# Patient Record
Sex: FEMALE | Race: WHITE
[De-identification: names, ages, dates, MRNs, and addresses within clinical notes are randomized per-mention and may not be internally consistent; named-entity substitution may affect disease eponyms.]

---

## 2019-01-05 ENCOUNTER — HOSPITAL ENCOUNTER (OUTPATIENT)
Dept: HOSPITAL 80 - FED | Age: 29
Discharge: HOME | End: 2019-01-05
Attending: SURGERY
Payer: SELF-PAY

## 2019-01-05 VITALS — DIASTOLIC BLOOD PRESSURE: 85 MMHG | SYSTOLIC BLOOD PRESSURE: 124 MMHG

## 2019-01-05 DIAGNOSIS — K35.30: Primary | ICD-10-CM

## 2019-01-05 LAB — PLATELET # BLD: 258 10^3/UL (ref 150–400)

## 2019-01-05 PROCEDURE — 0DTJ0ZZ RESECTION OF APPENDIX, OPEN APPROACH: ICD-10-PCS | Performed by: SURGERY

## 2019-01-05 NOTE — PDDCSUM
Discharge Summary


Discharge Summary: 


Yanna underwent open appendectomy for early acute appendicitis.  She recovered 

uneventfully in the PACU and requested early discharge.


She was able to tolerate liquids and her incision was uncomplicated.  


DC meds: Norco 5/325 #20


   Ibuprofen 600 mg #30


   Senokot S #30


She will call my office for any questions or concerns/she was scheduled to fly 

out of state for work on Monday, but will delay that trip until Wednesday.


We discussed diet, activity and wound care





S MD Faith, FACS

## 2019-01-05 NOTE — POSTOPPROG
Post Op Note


Date of Operation: 01/05/19


Surgeon: Tae Cuevas (MD, FACS)


Anesthesiologist: Adan Lamb MD


Anesthesia: GET(General Endotracheal)


Pre-op Diagnosis: appendicitis


Procedure: appendectomy


Findings: acute appendicitis


Inf/Abcess present in the surg proc area at time of surgery?: Yes


Depth: Organ Space


EBL: Minimal (10ml)


Specimen(s): 





appendix

## 2019-01-05 NOTE — EDPHY
H & P


Stated Complaint: rlq abd pain


Time Seen by Provider: 01/05/19 09:34


HPI/ROS: 





CHIEF COMPLAINT:  Right lower quadrant abdominal pain since last evening





HISTORY OF PRESENT ILLNESS:  28-year-old female no history of abdominal 

surgeries complaining of right lower quadrant pain.  Initially started as 

periumbilical pain which is now migrated to the right lower quadrant since last 

evening.  Positive nausea.  No appetite.  Normal bowel movements.  No melena 

hematochezia.  No headache.  No fever no chills.  No trauma.  Last menstrual 

period 1 week ago.  Last oral intake lunch yesterday.





REVIEW OF SYSTEMS:


10 systems reviewed and negative with the exception of the elements mentioned 

in the history of present illness








PAST MEDICAL & SURGICAL  HISTORY:  No pertinent medical or surgical history    





SOCIAL HISTORY: Nonsmoker    








************


PHYSICAL EXAM





(Prior to examination, patient consented to physical exam, hands were washed 

and my usual and customary physical exam procedures followed)


1) GENERAL: Well-developed, well-nourished, alert and oriented.  Appears to be 

in no acute distress.


2) HEAD: Normocephalic, atraumatic


3) HEENT: Pupils equal, round, reactive to light bilaterally.  Sclera 

anicteric. 


4) NECK: Full range of motion, no meningeal signs.


5) LUNGS: Clear auscultation bilaterally, no wheezes, no rhonchi, no 

retractions.   


6) HEART: Regular rate and rhythm, no murmur, no heave, no gallop.


7) ABDOMEN: No guarding, positive McBurney's point pain, negative Huerta's, 

negative Rovsing's, negative peritoneal sign,


8) MUSCULOSKELETAL: Moving all extremities, no focal areas of tenderness, no 

obvious trauma.  No peripheral edema or discoloration.


9) BACK: No CVA tenderness, no midline vertebral tenderness, no fluctuance, no 

step-off, no obvious trauma, no visual or palpable abnormality. 


10) SKIN: No rash, no petechiae. 


11) Psychiatric:  Patient is oriented X 3, there is no agitation.








***************





DIFFERENTIAL DIAGNOSIS:   My differential diagnosis includes, but is not 

limited to, acute appendicitis, acute cholecystitis, bowel obstruction, acute 

pancreatitis, ovarian torsion, ectopic pregnancy, gastritis and urinary tract 

infection.  The patient understands that this diagnosis is provisional and can 

never be 100% accurate.  This is a partial list of diagnoses considered. These 

considerations are based on history, physical exam, past history and 

reassessment.








- Personal History


LMP (Females 10-55): 1-7 Days Ago


Current Tetanus/Diphtheria Vaccine: Yes





- Medical/Surgical History


Hx Asthma: No


Hx Chronic Respiratory Disease: No


Hx Diabetes: No


Hx Cardiac Disease: No


Hx Renal Disease: No


Hx Cirrhosis: No


Hx Alcoholism: No


Hx HIV/AIDS: No


Hx Splenectomy or Spleen Trauma: No


Other PMH: denies





- Social History


Smoking Status: Never smoked


Constitutional: 


 Initial Vital Signs











Temperature (C)  36.5 C   01/05/19 09:27


 


Heart Rate  78   01/05/19 09:27


 


Respiratory Rate  17   01/05/19 09:27


 


Blood Pressure  137/76 H  01/05/19 09:27


 


O2 Sat (%)  98   01/05/19 09:27








 











O2 Delivery Mode               Room Air














Allergies/Adverse Reactions: 


 





No Known Allergies Allergy (Unverified 01/05/19 09:27)


 








Home Medications: 














 Medication  Instructions  Recorded


 


NK [No Known Home Meds]  01/05/19














Medical Decision Making





- Diagnostics


Imaging Results: 


 Imaging Impressions





Abdomen Ultrasound  01/05/19 09:45


Impression: Nondiagnostic assessment of the appendix.


 


If there is further clinical concern regarding the patient's right lower 

quadrant pain, contrast-enhanced CT imaging could be considered.


 


Findings were discussed with XIN Alegria PA-C at 10:54, on 1/5/2019.


 


 








Pelvic/Renal Ultrasound  01/05/19 09:45


Impression: 


 


1. There appears to be a complex collapsing right ovarian structure measuring 

2.3 cm in diameter, with no evidence of torsion or free fluid. As clinically 

directed, repeat evaluation in 8-12 weeks could be considered.


2. There is an intrauterine device, which appears appropriately positioned with 

the caveat that there is some limitation in evaluation of the position of the 

right side arm. 


3. There is a subcentimeter collapsing cyst at the anterior mid endometrial-

myometrial interface.


 


Findings were discussed with XIN Alegria PA-C at 10:55, on 1/5/2019.


 








Abdomen CT  01/05/19 11:13


Impression: Acute appendicitis/periappendicitis, with mild associated 

gastroenteric dysmotility.


 


Findings were discussed with LUNA Alegria at 12:54, on 1/5/2019.


 


 











ED Course/Re-evaluation: 





9:57 a.m.:  Patient has focal to tender to palpation right lower quadrant.  

Will obtain both pelvic and abdominal ultrasound to evaluate ovaries and 

appendix.  She remains NPO since lunch yesterday.  Care of patient under 

supervision of secondary supervising physician Dr Junior with whom I discussed 

case.





11:14 a.m.:  Re-evaluation.  Patient's ultrasound of the abdomen is 

nondiagnostic for appendicitis.  She is noted to have involuting ovarian cyst 

which may be the etiology of her symptoms however on re-examination she remains 

focally tender McBurney's point, white count of 19573.  Recommended CT imaging 

of the abdomen pelvis.  Indications risks benefits discussed with patient and 

she verbally consents.





12:56 p.m.:  CT abdomen pelvis interpreted by staff radiologist positive for 

appendicitis at this time.  Will consult with surgery administer IV 

antibiotics.  Last oral intake was lunch yesterday





1:00 p.m.:  Consultation Dr. Cuevas who will take patient to OR later.  Spoke 

with the patient.  She verbalized understanding of her current diagnosis.  She 

remains NPO since yesterday afternoon.  Will Be given IV Rocephin and Flagyl.











- Data Points


Laboratory Results: 


 Laboratory Results





 01/05/19 09:40 





 01/05/19 09:40 





 











  01/05/19 01/05/19 01/05/19





  09:50 09:40 09:40


 


WBC      





    


 


RBC      





    


 


Hgb      





    


 


Hct      





    


 


MCV      





    


 


MCH      





    


 


MCHC      





    


 


RDW      





    


 


Plt Count      





    


 


MPV      





    


 


Neut % (Auto)      





    


 


Lymph % (Auto)      





    


 


Mono % (Auto)      





    


 


Eos % (Auto)      





    


 


Baso % (Auto)      





    


 


Nucleat RBC Rel Count      





    


 


Absolute Neuts (auto)      





    


 


Absolute Lymphs (auto)      





    


 


Absolute Monos (auto)      





    


 


Absolute Eos (auto)      





    


 


Absolute Basos (auto)      





    


 


Absolute Nucleated RBC      





    


 


Immature Gran %      





    


 


Immature Gran #      





    


 


Sodium      140 mEq/L mEq/L





     (135-145) 


 


Potassium      3.5 mEq/L mEq/L





     (3.5-5.2) 


 


Chloride      104 mEq/L mEq/L





     () 


 


Carbon Dioxide      23 mEq/l mEq/l





     (22-31) 


 


Anion Gap      13 mEq/L mEq/L





     (6-14) 


 


BUN      9 mg/dL mg/dL





     (7-23) 


 


Creatinine      0.8 mg/dL mg/dL





     (0.6-1.0) 


 


Estimated GFR      > 60 





    


 


Glucose      93 mg/dL mg/dL





     () 


 


Calcium      9.8 mg/dL mg/dL





     (8.5-10.4) 


 


Total Bilirubin      1.0 mg/dL mg/dL





     (0.1-1.4) 


 


Conjugated Bilirubin      0.3 mg/dL mg/dL





     (0.0-0.5) 


 


Unconjugated Bilirubin      0.7 mg/dL mg/dL





     (0.0-1.1) 


 


AST      24 IU/L IU/L





     (14-46) 


 


ALT      10 IU/L IU/L





     (9-52) 


 


Alkaline Phosphatase      71 IU/L IU/L





     () 


 


Total Protein      8.5 g/dL H g/dL





     (6.3-8.2) 


 


Albumin      5.1 g/dL H g/dL





     (3.5-5.0) 


 


Lipase      84 IU/L IU/L





     () 


 


Beta HCG, Qual    NEGATIVE   





    


 


Urine Color  VENTURA     





    


 


Urine Appearance  MODERATELY TURBID     





    


 


Urine pH  5.0     





   (5.0-7.5)   


 


Ur Specific Gravity  1.027     





   (1.002-1.030)   


 


Urine Protein  1+  H     





   (NEGATIVE)   


 


Urine Ketones  1+  H     





   (NEGATIVE)   


 


Urine Blood  1+  H     





   (NEGATIVE)   


 


Urine Nitrate  NEGATIVE     





   (NEGATIVE)   


 


Urine Bilirubin  NEGATIVE     





   (NEGATIVE)   


 


Urine Urobilinogen  NEGATIVE EU EU    





   (0.2-1.0)   


 


Ur Leukocyte Esterase  TRACE  H     





   (NEGATIVE)   


 


Urine RBC  3-5 /hpf H /hpf    





   (0-3)   


 


Urine WBC  5-10 /hpf H /hpf    





   (0-3)   


 


Ur Epithelial Cells  1+ /lpf /lpf    





   (NONE-1+)   


 


Urine Bacteria  TRACE /hpf H /hpf    





   (NONE SEEN)   


 


Urine Mucus  4+ /lpf H /lpf    





   (NONE-1+)   


 


Urine Glucose  NEGATIVE     





   (NEGATIVE)   














  01/05/19





  09:40


 


WBC  14.88 10^3/uL H 10^3/uL





   (3.80-9.50) 


 


RBC  4.69 10^6/uL 10^6/uL





   (4.18-5.33) 


 


Hgb  14.3 g/dL g/dL





   (12.6-16.3) 


 


Hct  42.6 % %





   (38.0-47.0) 


 


MCV  90.8 fL fL





   (81.5-99.8) 


 


MCH  30.5 pg pg





   (27.9-34.1) 


 


MCHC  33.6 g/dL g/dL





   (32.4-36.7) 


 


RDW  12.8 % %





   (11.5-15.2) 


 


Plt Count  258 10^3/uL 10^3/uL





   (150-400) 


 


MPV  9.8 fL fL





   (8.7-11.7) 


 


Neut % (Auto)  76.8 % H %





   (39.3-74.2) 


 


Lymph % (Auto)  14.7 % L %





   (15.0-45.0) 


 


Mono % (Auto)  7.9 % %





   (4.5-13.0) 


 


Eos % (Auto)  0.2 % L %





   (0.6-7.6) 


 


Baso % (Auto)  0.2 % L %





   (0.3-1.7) 


 


Nucleat RBC Rel Count  0.0 % %





   (0.0-0.2) 


 


Absolute Neuts (auto)  11.43 10^3/uL H 10^3/uL





   (1.70-6.50) 


 


Absolute Lymphs (auto)  2.19 10^3/uL 10^3/uL





   (1.00-3.00) 


 


Absolute Monos (auto)  1.17 10^3/uL H 10^3/uL





   (0.30-0.80) 


 


Absolute Eos (auto)  0.03 10^3/uL 10^3/uL





   (0.03-0.40) 


 


Absolute Basos (auto)  0.03 10^3/uL 10^3/uL





   (0.02-0.10) 


 


Absolute Nucleated RBC  0.00 10^3/uL 10^3/uL





   (0-0.01) 


 


Immature Gran %  0.2 % %





   (0.0-1.1) 


 


Immature Gran #  0.03 10^3/uL 10^3/uL





   (0.00-0.10) 


 


Sodium  





  


 


Potassium  





  


 


Chloride  





  


 


Carbon Dioxide  





  


 


Anion Gap  





  


 


BUN  





  


 


Creatinine  





  


 


Estimated GFR  





  


 


Glucose  





  


 


Calcium  





  


 


Total Bilirubin  





  


 


Conjugated Bilirubin  





  


 


Unconjugated Bilirubin  





  


 


AST  





  


 


ALT  





  


 


Alkaline Phosphatase  





  


 


Total Protein  





  


 


Albumin  





  


 


Lipase  





  


 


Beta HCG, Qual  





  


 


Urine Color  





  


 


Urine Appearance  





  


 


Urine pH  





  


 


Ur Specific Gravity  





  


 


Urine Protein  





  


 


Urine Ketones  





  


 


Urine Blood  





  


 


Urine Nitrate  





  


 


Urine Bilirubin  





  


 


Urine Urobilinogen  





  


 


Ur Leukocyte Esterase  





  


 


Urine RBC  





  


 


Urine WBC  





  


 


Ur Epithelial Cells  





  


 


Urine Bacteria  





  


 


Urine Mucus  





  


 


Urine Glucose  





  











Medications Given: 


 





Ceftriaxone Sodium/Dextrose (Rocephin 1 Gm (Premix))  50 mls @ 100 mls/hr IV 

EDNOW ONE


   PRN Reason: Protocol


   Stop: 01/05/19 13:27


   Last Admin: 01/05/19 13:07 Dose:  50 mls


Metronidazole/Sodium Chloride (Flagyl 500 Mg (Premix))  100 mls @ 100 mls/hr IV 

EDNOW ONE


   PRN Reason: Protocol


   Stop: 01/05/19 13:57


   Last Admin: 01/05/19 13:09 Dose:  100 mls








Departure





- Departure


Disposition: [facility]


Clinical Impression: 


Acute appendicitis


Qualifiers:


 Acute appendicitis type: with localized peritonitis Appendicitis gangrene 

presence: without gangrene Appendicitis perforation presence: without 

perforation Appendicitis abscess presence: without abscess Qualified Code(s): 

K35.30 - Acute appendicitis with localized peritonitis, without perforation or 

gangrene





Condition: Fair

## 2019-01-05 NOTE — PDANEPAE
ANE History of Present Illness





Appendectomy laparoscopic vs. open





ANE Past Medical History





- Cardiovascular History


Hx Hypertension: No


Hx Arrhythmias: No


Hx Chest Pain: No


Hx Coronary Artery / Peripheral Vascular Disease: No


Hx CHF / Valvular Disease: No


Hx Palpitations: No





- Pulmonary History


Hx COPD: No


Hx Asthma/Reactive Airway Disease: No


Hx Recent Upper Respiratory Infection: No


Hx Oxygen in Use at Home: No


Hx Sleep Apnea: No





- Neurologic History


Hx Cerebrovascular Accident: No


Hx Seizures: No


Hx Dementia: No





- Endocrine History


Hx Diabetes: No





- Renal History


Hx Renal Disorders: No





- Liver History


Hx Hepatic Disorders: No





- Neurological & Psychiatric Hx


Hx Neurological and Psychiatric Disorders: No





- Cancer History


Hx Cancer: No





- Congenital Disorder History


Hx Congenital Disorders: No





- GI History


Hx Gastrointestinal Disorders: No





ANE Review of Systems


Review of systems is: negative


Review of Systems: 








- Exercise capacity


Exercise capacity: >=4 METS


METS (RN): 6 METS





ANE Patient History





- Allergies


Allergies/Adverse Reactions: 








No Known Allergies Allergy (Unverified 01/05/19 09:27)


 








- Home Medications


Home medications: home medication list seen and reviewed


Home Medications: 








NK [No Known Home Meds]  01/05/19 [Last Taken Unknown]








- NPO status


NPO Since - Liquids (Date): 01/05/19


NPO Since - Liquids (Time): 08:00


NPO Since - Solids (Date): 01/04/19





- Anes Hx


Anes Hx: no prior problems





- Smoking Hx


Smoking Status: Never smoked





- Family Anes Hx


Family Anes Hx: none





ANE Labs/Vital Signs





- Labs


Result Diagrams: 


 01/05/19 09:40





 01/05/19 09:40





- Vital Signs


Vital Signs: reviewed preoperatively; see RN documention for details


Blood Pressure: 110/71


Heart Rate: 82


Respiratory Rate: 18


O2 Sat (%): 95


Height: 170.18 cm


Weight: 72.575 kg





ANE Physical Exam





- Airway


Neck exam: FROM


Mallampati Score: Class 1


Mouth exam: normal dental/mouth exam





- Pulmonary


Pulmonary: no respiratory distress





- Cardiovascular


Cardiovascular: regular rate and rhythym





- ASA Status


ASA Status: I, E





ANE Anesthesia Plan


Anesthesia Plan: general endotracheal anesthesia

## 2019-01-05 NOTE — PDGENHP
History and Physical





- Chief Complaint


abd pain





- History of Present Illness


29 y/o female visiting [place] on a work assignment when she developed abd pain 

yesterday. She presented today to the ED and was found to have appendicits 

confirmed by CT.  Surgical consultation was requested





History Information





- Allergies/Home Medication List


Allergies/Adverse Reactions: 








No Known Allergies Allergy (Unverified 19 09:27)


 





Home Medications: 








NK [No Known Home Meds]  19 [Last Taken Unknown]





I have personally reviewed and updated: family history, medical history, social 

history, surgical history





- Past Medical History


asthma


Additional medical history:  LMP 2 days ago





- Surgical History


Reports: no pertinent surgical hx





- Social History


Smoking Status: Never smoked


Alcohol Use: Occasionally


Drug Use: None





Review of Systems


Review of Systems: 





Respiratory: Reports: no symptoms


Gastrointestinal: Reports: abdominal pain, abdominal distention, nausea





Physical Exam


Physical Exam: 

















Temp Pulse Resp BP Pulse Ox


 


 36.5 C   82   18   110/71   95 


 


 19 15:50  19 15:58  19 15:58  19 15:58  19 15:58











Constitutional: no apparent distress


Eyes: anicteric sclera


Ears, Nose, Mouth, Throat: moist mucous membranes


Cardiovascular: regular rate and rhythym


Respiratory: clear to auscultation


Gastrointestinal: soft, non-tender abdomen


Genitourinary: no bladder fullness


Skin: warm, no rashes or abrasions


Neurologic: AAOx3


Psychiatric: interacting appropriately, not anxious





Lab Data & Imaging Review





 19 09:40





 19 09:40














WBC  14.88 10^3/uL (3.80-9.50)  H  19  09:40    


 


RBC  4.69 10^6/uL (4.18-5.33)   19  09:40    


 


Hgb  14.3 g/dL (12.6-16.3)   19  09:40    


 


Hct  42.6 % (38.0-47.0)   19  09:40    


 


MCV  90.8 fL (81.5-99.8)   19  09:40    


 


MCH  30.5 pg (27.9-34.1)   19  09:40    


 


MCHC  33.6 g/dL (32.4-36.7)   19  09:40    


 


RDW  12.8 % (11.5-15.2)   19  09:40    


 


Plt Count  258 10^3/uL (150-400)   19  09:40    


 


MPV  9.8 fL (8.7-11.7)   19  09:40    


 


Neut % (Auto)  76.8 % (39.3-74.2)  H  19  09:40    


 


Lymph % (Auto)  14.7 % (15.0-45.0)  L  19  09:40    


 


Mono % (Auto)  7.9 % (4.5-13.0)   19  09:40    


 


Eos % (Auto)  0.2 % (0.6-7.6)  L  19  09:40    


 


Baso % (Auto)  0.2 % (0.3-1.7)  L  19  09:40    


 


Nucleat RBC Rel Count  0.0 % (0.0-0.2)   19  09:40    


 


Absolute Neuts (auto)  11.43 10^3/uL (1.70-6.50)  H  19  09:40    


 


Absolute Lymphs (auto)  2.19 10^3/uL (1.00-3.00)   19  09:40    


 


Absolute Monos (auto)  1.17 10^3/uL (0.30-0.80)  H  19  09:40    


 


Absolute Eos (auto)  0.03 10^3/uL (0.03-0.40)   19  09:40    


 


Absolute Basos (auto)  0.03 10^3/uL (0.02-0.10)   19  09:40    


 


Absolute Nucleated RBC  0.00 10^3/uL (0-0.01)   19  09:40    


 


Immature Gran %  0.2 % (0.0-1.1)   19  09:40    


 


Immature Gran #  0.03 10^3/uL (0.00-0.10)   19  09:40    


 


Sodium  140 mEq/L (135-145)   19  09:40    


 


Potassium  3.5 mEq/L (3.5-5.2)   19  09:40    


 


Chloride  104 mEq/L ()   19  09:40    


 


Carbon Dioxide  23 mEq/l (22-31)   19  09:40    


 


Anion Gap  13 mEq/L (6-14)   19  09:40    


 


BUN  9 mg/dL (7-23)   19  09:40    


 


Creatinine  0.8 mg/dL (0.6-1.0)   19  09:40    


 


Estimated GFR  > 60   19  09:40    


 


Glucose  93 mg/dL ()   19  09:40    


 


Calcium  9.8 mg/dL (8.5-10.4)   19  09:40    


 


Total Bilirubin  1.0 mg/dL (0.1-1.4)   19  09:40    


 


Conjugated Bilirubin  0.3 mg/dL (0.0-0.5)   19  09:40    


 


Unconjugated Bilirubin  0.7 mg/dL (0.0-1.1)   19  09:40    


 


AST  24 IU/L (14-46)   19  09:40    


 


ALT  10 IU/L (9-52)   19  09:40    


 


Alkaline Phosphatase  71 IU/L ()   19  09:40    


 


Total Protein  8.5 g/dL (6.3-8.2)  H  19  09:40    


 


Albumin  5.1 g/dL (3.5-5.0)  H  19  09:40    


 


Lipase  84 IU/L ()   19  09:40    


 


Beta HCG, Qual  NEGATIVE   19  09:40    


 


Urine Color  VENTURA   19  09:50    


 


Urine Appearance  MODERATELY TURBID   19  09:50    


 


Urine pH  5.0  (5.0-7.5)   19  09:50    


 


Ur Specific Gravity  1.027  (1.002-1.030)   19  09:50    


 


Urine Protein  1+  (NEGATIVE)  H  19  09:50    


 


Urine Ketones  1+  (NEGATIVE)  H  19  09:50    


 


Urine Blood  1+  (NEGATIVE)  H  19  09:50    


 


Urine Nitrate  NEGATIVE  (NEGATIVE)   19  09:50    


 


Urine Bilirubin  NEGATIVE  (NEGATIVE)   19  09:50    


 


Urine Urobilinogen  NEGATIVE EU (0.2-1.0)   19  09:50    


 


Ur Leukocyte Esterase  TRACE  (NEGATIVE)  H  19  09:50    


 


Urine RBC  3-5 /hpf (0-3)  H  19  09:50    


 


Urine WBC  5-10 /hpf (0-3)  H  19  09:50    


 


Ur Epithelial Cells  1+ /lpf (NONE-1+)   19  09:50    


 


Urine Bacteria  TRACE /hpf (NONE SEEN)  H  19  09:50    


 


Urine Mucus  4+ /lpf (NONE-1+)  H  19  09:50    


 


Urine Glucose  NEGATIVE  (NEGATIVE)   19  09:50    








Visualized and Interpreted imaging results: Yes


Interpretation: CT reviewed with patient/acute appendicitis confirmed with tanya-

appendiceal inflammation





Assessment & Plan


Assessment: 








Acute appendicitis (Acute)








Plan: 


I recommended appendectomy. We discussed the alternatives including antibiotic 

therapy.


We discussed the procedure, risks and expected recovery.  Informed consent was 

obtained.

## 2019-01-06 NOTE — GOP
DATE OF OPERATION:  01/05/2019



SURGEON:  Tae Cuevas MD, FACS



ANESTHESIA:  General endotracheal.



ANESTHESIOLOGIST:  Adan Lamb MD



PREOPERATIVE DIAGNOSIS:  Acute appendicitis.



POSTOPERATIVE DIAGNOSIS:  Acute appendicitis.



PROCEDURE PERFORMED:  Open appendectomy.



FINDINGS:  Acute suppurative appendicitis without perforation or gangrene.





ESTIMATED BLOOD LOSS:  10 cc.



DESCRIPTION OF PROCEDURE:  After informed consent was obtained, the patient was 
brought to the operating room and placed under general anesthesia.  The abdomen 
was prepped and draped in the usual fashion.  Before proceeding, a time-out and 
identification of the patient was performed. 



I discussed with the patient preoperatively the pros and cons of laparoscopic 
versus open appendectomy and she preferred open appendectomy because of the 
location of the incision. 



The patient received ceftriaxone and Flagyl preoperatively.  0.25% Marcaine was 
used to establish a regional field block and a small low right lower quadrant 
incision was made through the skin and subcutaneous tissues.  Dissection was 
carried out with cautery down to the fascia, which was incised transversely 
between the rectus sheath and oblique muscles.  A plane of dissection was 
entered between the rectus and internal and transversus abdominis muscles.  The 
peritoneum was incised and the peritoneal cavity was entered and explored.  The 
appendix was mobilized into the incision and noted to be markedly suppurative 
but not perforated and without foul odor.  The mesoappendix was clamped, 
divided and ligated with 3-0 Vicryl ligatures and the appendix  from 
the cecum with a single firing of the SUHAIL stapler.  The appendix was removed 
from the field.  The stump of the appendix had a small bleeder along the edge 
of the staple line.  This was oversewn with a 3-0 Vicryl suture ligature.  The 
cecum was then returned to its anatomic position.  The pelvis and pericolic 
gutter were irrigated with normal saline until the effluent was clear.  
Hemostasis appeared secure.  Peritoneum was closed with continuous running 3-0 
Vicryl suture.  The anterior fascia was closed with 0 PDS suture.  The 
subcutaneous tissues were irrigated, approximated with 3-0 Monocryl suture, and 
the skin was closed with 4-0 Monocryl suture in a subcuticular fashion.  
Topical Dermabond was applied.  The patient was returned to the recovery room 
in satisfactory condition.  Needle, sponge, and instrument count were correct.



COMPLICATIONS:  None.





Job #:  [id number]

MTDD